# Patient Record
Sex: FEMALE | Race: WHITE | NOT HISPANIC OR LATINO | Employment: OTHER | URBAN - METROPOLITAN AREA
[De-identification: names, ages, dates, MRNs, and addresses within clinical notes are randomized per-mention and may not be internally consistent; named-entity substitution may affect disease eponyms.]

---

## 2021-12-21 ENCOUNTER — PATIENT MESSAGE (OUTPATIENT)
Dept: NEUROLOGY | Facility: CLINIC | Age: 56
End: 2021-12-21
Payer: MEDICARE

## 2022-02-28 ENCOUNTER — PATIENT MESSAGE (OUTPATIENT)
Dept: PSYCHIATRY | Facility: CLINIC | Age: 57
End: 2022-02-28
Payer: MEDICARE

## 2022-06-24 ENCOUNTER — PATIENT MESSAGE (OUTPATIENT)
Dept: PSYCHIATRY | Facility: CLINIC | Age: 57
End: 2022-06-24
Payer: MEDICARE

## 2022-09-01 ENCOUNTER — OFFICE VISIT (OUTPATIENT)
Dept: OTOLARYNGOLOGY | Facility: CLINIC | Age: 57
End: 2022-09-01
Payer: MEDICARE

## 2022-09-01 VITALS — TEMPERATURE: 98 F

## 2022-09-01 DIAGNOSIS — H61.22 IMPACTED CERUMEN, LEFT EAR: ICD-10-CM

## 2022-09-01 DIAGNOSIS — S01.311A LACERATION OF RIGHT EAR CANAL, INITIAL ENCOUNTER: Primary | ICD-10-CM

## 2022-09-01 PROCEDURE — 69210 REMOVE IMPACTED EAR WAX UNI: CPT | Mod: PBBFAC | Performed by: STUDENT IN AN ORGANIZED HEALTH CARE EDUCATION/TRAINING PROGRAM

## 2022-09-01 PROCEDURE — 69210 PR REMOVAL IMPACTED CERUMEN REQUIRING INSTRUMENTATION, UNILATERAL: ICD-10-PCS | Mod: S$PBB,,, | Performed by: STUDENT IN AN ORGANIZED HEALTH CARE EDUCATION/TRAINING PROGRAM

## 2022-09-01 PROCEDURE — 69210 REMOVE IMPACTED EAR WAX UNI: CPT | Mod: S$PBB,,, | Performed by: STUDENT IN AN ORGANIZED HEALTH CARE EDUCATION/TRAINING PROGRAM

## 2022-09-01 PROCEDURE — 99999 PR PBB SHADOW E&M-EST. PATIENT-LVL III: CPT | Mod: PBBFAC,,, | Performed by: STUDENT IN AN ORGANIZED HEALTH CARE EDUCATION/TRAINING PROGRAM

## 2022-09-01 PROCEDURE — 99213 OFFICE O/P EST LOW 20 MIN: CPT | Mod: PBBFAC | Performed by: STUDENT IN AN ORGANIZED HEALTH CARE EDUCATION/TRAINING PROGRAM

## 2022-09-01 PROCEDURE — 99203 OFFICE O/P NEW LOW 30 MIN: CPT | Mod: S$PBB,25,, | Performed by: STUDENT IN AN ORGANIZED HEALTH CARE EDUCATION/TRAINING PROGRAM

## 2022-09-01 PROCEDURE — 99999 PR PBB SHADOW E&M-EST. PATIENT-LVL III: ICD-10-PCS | Mod: PBBFAC,,, | Performed by: STUDENT IN AN ORGANIZED HEALTH CARE EDUCATION/TRAINING PROGRAM

## 2022-09-01 PROCEDURE — 99203 PR OFFICE/OUTPT VISIT, NEW, LEVL III, 30-44 MIN: ICD-10-PCS | Mod: S$PBB,25,, | Performed by: STUDENT IN AN ORGANIZED HEALTH CARE EDUCATION/TRAINING PROGRAM

## 2022-09-01 NOTE — PROGRESS NOTES
Chief complaint:   Chief Complaint   Patient presents with    Ear Drainage     Patient presents to clinic for drainage/bleeding out of right ear. Patient states that she is on blood thinner and her ears were itching so she used Q-tips to clean out her ears on Saturday, . Patient states bleeding occurred 15 minutes after cleaning and bleeding began to trickle down over the next couple of hours. MD at nursing home saw a blood clot in her ear and removed it. Patient denies any other pertinent symptoms at this time.          Referring Provider:  Aaareferral Self  No address on file    History of Present Illness:     Ms. Jeffers is a 57 y.o. female presenting for evaluation of right ear bleeding. Onset of this chief complaint was about 4 days ago. Using q-tips and had bleeding. Self-resolved after a couple days.  Additional symptoms that also have been associated are muffled hearing which improved a clot wsa removed by doc at her facility. The patient uses q-tips daily.     Review of Systems     A complete 10 point ROS was completed and are positive as per above HPI.    Otherwise negative for fever, diplopia, chest pain, shortness of breath, vomiting, blood in urine, joint pain, skin rash, seizures and unusual bleeding.       History        Past Medical History:   Past Medical History:   Diagnosis Date    Abnormal Pap smear     15 YRS AGO [facility]    COPD (chronic obstructive pulmonary disease)     Hypertension     MS (multiple sclerosis)     .          Past Surgical History:  Past Surgical History:   Procedure Laterality Date     SECTION      CYST REMOVED FROM NECK      BILATERAL    CYST REMOVED FROM RIGHT BREAST      TONSILLECTOMY, ADENOIDECTOMY      TYMPANOSTOMY TUBE PLACEMENT     .         Medications: Medication list was reviewed. She  has a current medication list which includes the following prescription(s): albuterol, baclofen, copaxone, fluticasone propionate, gabapentin, lisinopril, tizanidine,  and venlafaxine.         Allergies: Review of patient's allergies indicates:  No Known Allergies         Family history: family history includes Diabetes in her father; Hypertension in her father.         Social History          Alcohol use:  reports no history of alcohol use.            Tobacco:  reports that she has been smoking. She has been smoking an average of .5 packs per day. She does not have any smokeless tobacco history on file.         Please see the patient's intake form for full details of past medical history, past surgical history, family history, social history and review of systems. ?This information was reviewed by me and noted.      Physical Examination     General: Well developed, well nourished, well hydrated. Verbal with a strong voice and not dysphonic.     Head/Face: Normocephalic, atraumatic. No scars or lesions. Facial musculature equal.     Eyes: No scleral icterus or conjunctival hemorrhage. EOMI. PERRLA.     Ears: see procedure    Hearing: grossly intact    Nose: No gross deformity or lesions. No purulent discharge. No significant NSD.      Mouth/Oropharynx: Lips without any lesions. No mucosal lesions within the oropharynx. No tonsillar exudate or lesions. Pharyngeal walls symmetrical. Uvula midline. Tongue midline without lesions.     Neck: Trachea midline. No masses. No thyromegaly or nodules palpated.     Lymphatic: No lymphadenopathy in the neck.     Extremities: No cyanosis. Warm and well-perfused.     Skin: No scars or lesions on face or neck.      Neurologic: Moving all extremities without gross abnormality.CN II-XII grossly intact. House-Brackmann 1/6. No signs of nystagmus.      Psych: Alert and oriented to person, place, and time with an appropriate mood and affect.     Procedure: ear microscopy with removal of cerumen    Description: The patient was in agreement with the examination and debridement of the ears. Removal of the cerumen required use of an operating microscope  and multiple micro-instruments.     With the patient in the supine position, we used the operating microscope to examine both ears with the appropriate sized ear speculum.  A variety of sterile, micro-instruments were utilized to remove the cerumen atraumatically.  I performed the procedure which required a significant amount of time and effort. The tympanic membrane was then well visualized.  The patient tolerated the procedure well and there were no complications.    Findings:   Right ear had no wax, mild dried blood and healing lac of EAC, and the tympanic membrane was intact with no evidence of middle ear fluid.    Left ear had significant wax firm, dense wax, mostly removed but remaninig against the TM was unable to be removed today.       Assessment/Plan:      1. Laceration of right ear canal, initial encounter    2. Impacted cerumen, left ear         Drops for 2 weeks and repeat exam, cleaning at that time  Use in both sides to prevent infection secondary to lac on right and loosen wax on left  Stop use of q-tips    Trev Pereira MD  [facility]   [facility] - [address] [place] [address]  P: [phone]  F: [phone]

## 2022-09-22 ENCOUNTER — OFFICE VISIT (OUTPATIENT)
Dept: OTOLARYNGOLOGY | Facility: CLINIC | Age: 57
End: 2022-09-22
Payer: MEDICARE

## 2022-09-22 VITALS — DIASTOLIC BLOOD PRESSURE: 83 MMHG | TEMPERATURE: 98 F | HEART RATE: 71 BPM | SYSTOLIC BLOOD PRESSURE: 135 MMHG

## 2022-09-22 DIAGNOSIS — S01.311A LACERATION OF RIGHT EAR CANAL, INITIAL ENCOUNTER: Primary | ICD-10-CM

## 2022-09-22 DIAGNOSIS — H61.22 IMPACTED CERUMEN, LEFT EAR: ICD-10-CM

## 2022-09-22 DIAGNOSIS — H61.22 IMPACTED CERUMEN OF LEFT EAR: ICD-10-CM

## 2022-09-22 PROCEDURE — 99999 PR PBB SHADOW E&M-EST. PATIENT-LVL III: ICD-10-PCS | Mod: PBBFAC,,, | Performed by: STUDENT IN AN ORGANIZED HEALTH CARE EDUCATION/TRAINING PROGRAM

## 2022-09-22 PROCEDURE — 99213 PR OFFICE/OUTPT VISIT, EST, LEVL III, 20-29 MIN: ICD-10-PCS | Mod: S$PBB,25,, | Performed by: STUDENT IN AN ORGANIZED HEALTH CARE EDUCATION/TRAINING PROGRAM

## 2022-09-22 PROCEDURE — 99213 OFFICE O/P EST LOW 20 MIN: CPT | Mod: PBBFAC,25 | Performed by: STUDENT IN AN ORGANIZED HEALTH CARE EDUCATION/TRAINING PROGRAM

## 2022-09-22 PROCEDURE — 69210 REMOVE IMPACTED EAR WAX UNI: CPT | Mod: PBBFAC | Performed by: STUDENT IN AN ORGANIZED HEALTH CARE EDUCATION/TRAINING PROGRAM

## 2022-09-22 PROCEDURE — 69210 PR REMOVAL IMPACTED CERUMEN REQUIRING INSTRUMENTATION, UNILATERAL: ICD-10-PCS | Mod: S$PBB,,, | Performed by: STUDENT IN AN ORGANIZED HEALTH CARE EDUCATION/TRAINING PROGRAM

## 2022-09-22 PROCEDURE — 99213 OFFICE O/P EST LOW 20 MIN: CPT | Mod: S$PBB,25,, | Performed by: STUDENT IN AN ORGANIZED HEALTH CARE EDUCATION/TRAINING PROGRAM

## 2022-09-22 PROCEDURE — 99999 PR PBB SHADOW E&M-EST. PATIENT-LVL III: CPT | Mod: PBBFAC,,, | Performed by: STUDENT IN AN ORGANIZED HEALTH CARE EDUCATION/TRAINING PROGRAM

## 2022-09-22 PROCEDURE — 69210 REMOVE IMPACTED EAR WAX UNI: CPT | Mod: S$PBB,,, | Performed by: STUDENT IN AN ORGANIZED HEALTH CARE EDUCATION/TRAINING PROGRAM

## 2022-09-22 NOTE — PROGRESS NOTES
Chief complaint:   Chief Complaint   Patient presents with    Follow-up            Referring Provider:  No referring provider defined for this encounter.    History of Present Illness, 22:       Ms. Jeffers is a 57 y.o. female presenting for evaluation of right ear bleeding. Onset of this chief complaint was about 4 days ago. Using q-tips and had bleeding. Self-resolved after a couple days.  Additional symptoms that also have been associated are muffled hearing which improved a clot wsa removed by doc at her facility. The patient uses q-tips daily.     Return clinic visit, 22    Presents for ear lac follow up.     Only got the drops intermittently given at her nursing home.   Still cannot hear at all from left ear. No further pain, drainage.     Review of Systems     A complete 10 point ROS was completed and are positive as per above HPI.    Otherwise negative for fever, diplopia, chest pain, shortness of breath, vomiting, blood in urine, joint pain, skin rash, seizures and unusual bleeding.       History        Past Medical History:   Past Medical History:   Diagnosis Date    Abnormal Pap smear     15 YRS AGO [facility]    COPD (chronic obstructive pulmonary disease)     Hypertension     MS (multiple sclerosis)     .          Past Surgical History:  Past Surgical History:   Procedure Laterality Date     SECTION      CYST REMOVED FROM NECK      BILATERAL    CYST REMOVED FROM RIGHT BREAST      TONSILLECTOMY, ADENOIDECTOMY      TYMPANOSTOMY TUBE PLACEMENT     .         Medications: Medication list was reviewed. She  has a current medication list which includes the following prescription(s): albuterol, baclofen, copaxone, fluticasone propionate, gabapentin, lisinopril, tizanidine, and venlafaxine.         Allergies: Review of patient's allergies indicates:  No Known Allergies         Family history: family history includes Diabetes in her father; Hypertension in her father.         Social History           Alcohol use:  reports no history of alcohol use.            Tobacco:  reports that she has been smoking cigarettes. She has been smoking an average of .5 packs per day. She uses smokeless tobacco.         Please see the patient's intake form for full details of past medical history, past surgical history, family history, social history and review of systems. ?This information was reviewed by me and noted.      Physical Examination     General: Well developed, well nourished, well hydrated. Verbal with a strong voice and not dysphonic.     Head/Face: Normocephalic, atraumatic. No scars or lesions. Facial musculature equal.     Eyes: No scleral icterus or conjunctival hemorrhage. EOMI. PERRLA.     Ears: see procedure    Hearing: grossly intact    Nose: No gross deformity or lesions. No purulent discharge. No significant NSD.      Mouth/Oropharynx: Lips without any lesions. No mucosal lesions within the oropharynx. No tonsillar exudate or lesions. Pharyngeal walls symmetrical. Uvula midline. Tongue midline without lesions.     Neck: Trachea midline. No masses. No thyromegaly or nodules palpated.     Lymphatic: No lymphadenopathy in the neck.     Extremities: No cyanosis. Warm and well-perfused.     Skin: No scars or lesions on face or neck.      Neurologic: Moving all extremities without gross abnormality.CN II-XII grossly intact. House-Brackmann 1/6. No signs of nystagmus.      Psych: Alert and oriented to person, place, and time with an appropriate mood and affect.     Procedure: ear microscopy with removal of cerumen    Description: The patient was in agreement with the examination and debridement of the ears. Removal of the cerumen required use of an operating microscope and multiple micro-instruments.     With the patient in the supine position, we used the operating microscope to examine both ears with the appropriate sized ear speculum.  A variety of sterile, micro-instruments were utilized to remove the  cerumen atraumatically.  I performed the procedure which required a significant amount of time and effort. The tympanic membrane was then well visualized.  The patient tolerated the procedure well and there were no complications.    Findings:   Right ear had no wax, heael lac of EAC, and the tympanic membrane was intact with no evidence of middle ear fluid.    Left ear had significant wax firm, dense wax and dried blood against TM, removed atrumatically, TM intact and no effusion    Hearing returned to baseline after the procedure     Assessment/Plan:      1. Laceration of right ear canal, initial encounter    2. Impacted cerumen, left ear    3. Impacted cerumen of left ear           Lac resolved      Cerumen Impaction  We discussed preventative measures and treatment options.  Q-tips must be avoided, instead the ears can be cleaned with OTC ear rinses (or mineral oil).  If the cerumen impacts the ear canal and causes hearing loss or infection she needs to follow-up in the clinic for treatment and cleaning.      Trev Pereira MD  [facility]   [facility] - [address]  [place] [address]  P: [phone]  F: [phone]

## 2022-12-01 ENCOUNTER — PATIENT MESSAGE (OUTPATIENT)
Dept: PSYCHIATRY | Facility: CLINIC | Age: 57
End: 2022-12-01
Payer: MEDICARE

## 2023-02-20 ENCOUNTER — PATIENT MESSAGE (OUTPATIENT)
Dept: PSYCHIATRY | Facility: CLINIC | Age: 58
End: 2023-02-20
Payer: MEDICARE

## 2023-07-21 ENCOUNTER — PATIENT MESSAGE (OUTPATIENT)
Dept: PSYCHIATRY | Facility: CLINIC | Age: 58
End: 2023-07-21
Payer: MEDICARE

## 2024-01-22 ENCOUNTER — PATIENT MESSAGE (OUTPATIENT)
Dept: PSYCHIATRY | Facility: CLINIC | Age: 59
End: 2024-01-22
Payer: MEDICARE

## 2024-05-02 ENCOUNTER — PATIENT MESSAGE (OUTPATIENT)
Dept: PSYCHIATRY | Facility: CLINIC | Age: 59
End: 2024-05-02
Payer: MEDICARE

## 2024-07-25 ENCOUNTER — PATIENT MESSAGE (OUTPATIENT)
Dept: PSYCHIATRY | Facility: CLINIC | Age: 59
End: 2024-07-25
Payer: MEDICARE

## 2024-08-05 ENCOUNTER — PATIENT MESSAGE (OUTPATIENT)
Dept: PSYCHIATRY | Facility: CLINIC | Age: 59
End: 2024-08-05
Payer: MEDICARE

## 2024-12-16 ENCOUNTER — PATIENT MESSAGE (OUTPATIENT)
Dept: PSYCHIATRY | Facility: CLINIC | Age: 59
End: 2024-12-16
Payer: MEDICARE